# Patient Record
Sex: FEMALE | Race: WHITE
[De-identification: names, ages, dates, MRNs, and addresses within clinical notes are randomized per-mention and may not be internally consistent; named-entity substitution may affect disease eponyms.]

---

## 2023-06-08 ENCOUNTER — APPOINTMENT (OUTPATIENT)
Dept: FAMILY MEDICINE | Facility: CLINIC | Age: 33
End: 2023-06-08
Payer: COMMERCIAL

## 2023-06-08 ENCOUNTER — NON-APPOINTMENT (OUTPATIENT)
Age: 33
End: 2023-06-08

## 2023-06-08 VITALS
HEART RATE: 88 BPM | HEIGHT: 64 IN | SYSTOLIC BLOOD PRESSURE: 100 MMHG | DIASTOLIC BLOOD PRESSURE: 68 MMHG | TEMPERATURE: 98 F | OXYGEN SATURATION: 98 % | BODY MASS INDEX: 23.9 KG/M2 | WEIGHT: 140 LBS

## 2023-06-08 DIAGNOSIS — Z00.00 ENCOUNTER FOR GENERAL ADULT MEDICAL EXAMINATION W/OUT ABNORMAL FINDINGS: ICD-10-CM

## 2023-06-08 PROCEDURE — 99385 PREV VISIT NEW AGE 18-39: CPT | Mod: 25

## 2023-06-08 PROCEDURE — 36415 COLL VENOUS BLD VENIPUNCTURE: CPT

## 2023-06-08 NOTE — PHYSICAL EXAM
[No Acute Distress] : no acute distress [Well Nourished] : well nourished [Well Developed] : well developed [Well-Appearing] : well-appearing [Normal Voice/Communication] : normal voice/communication [Normal Sclera/Conjunctiva] : normal sclera/conjunctiva [Supple] : supple [No Respiratory Distress] : no respiratory distress  [Clear to Auscultation] : lungs were clear to auscultation bilaterally [Normal Rate] : normal rate  [Normal S1, S2] : normal S1 and S2 [Soft] : abdomen soft [Non Tender] : non-tender [Normal Bowel Sounds] : normal bowel sounds obese, rrr [Speech Grossly Normal] : speech grossly normal [Normal Affect] : the affect was normal [Normal Mood] : the mood was normal

## 2023-06-08 NOTE — PLAN
[FreeTextEntry1] : 32-year-old female for new patient physical exam.  Labs as ordered.  Patient reports being up-to-date with Pap.\par \par Pregnancy.  Keep follow-up with OB.\par \par All questions answered.  Patient voiced understanding and agreement to plan.  Return to clinic as recommended.

## 2023-06-08 NOTE — HEALTH RISK ASSESSMENT
[No] : In the past 12 months have you used drugs other than those required for medical reasons? No [Patient reported PAP Smear was abnormal] : Patient reported PAP Smear was abnormal [Never] : Never [EyeExamDate] : 00/2020 [PapSmearDate] : 03/2022

## 2023-06-08 NOTE — HISTORY OF PRESENT ILLNESS
[FreeTextEntry1] : NPPE [de-identified] : 32-year-old female for new patient physical exam.  Patient reports being pregnant currently.  Went through IVF.  Doing well.  Patient works as a speech pathologist in the city.

## 2023-06-09 LAB
ALBUMIN SERPL ELPH-MCNC: 4.1 G/DL
ALP BLD-CCNC: 57 U/L
ALT SERPL-CCNC: 30 U/L
ANION GAP SERPL CALC-SCNC: 13 MMOL/L
APTT BLD: 29.6 SEC
AST SERPL-CCNC: 24 U/L
BILIRUB SERPL-MCNC: 0.2 MG/DL
BUN SERPL-MCNC: 11 MG/DL
CALCIUM SERPL-MCNC: 9.1 MG/DL
CHLORIDE SERPL-SCNC: 104 MMOL/L
CHOLEST SERPL-MCNC: 135 MG/DL
CO2 SERPL-SCNC: 23 MMOL/L
CREAT SERPL-MCNC: 0.77 MG/DL
EGFR: 106 ML/MIN/1.73M2
ESTIMATED AVERAGE GLUCOSE: 100 MG/DL
GLUCOSE SERPL-MCNC: 84 MG/DL
HBA1C MFR BLD HPLC: 5.1 %
HDLC SERPL-MCNC: 55 MG/DL
INR PPP: 0.99 RATIO
LDLC SERPL CALC-MCNC: 61 MG/DL
NONHDLC SERPL-MCNC: 80 MG/DL
POTASSIUM SERPL-SCNC: 4.6 MMOL/L
PROT SERPL-MCNC: 6.5 G/DL
PT BLD: 11.6 SEC
SODIUM SERPL-SCNC: 140 MMOL/L
TRIGL SERPL-MCNC: 96 MG/DL
TSH SERPL-ACNC: 1.85 UIU/ML

## 2023-06-13 ENCOUNTER — TRANSCRIPTION ENCOUNTER (OUTPATIENT)
Age: 33
End: 2023-06-13

## 2024-02-01 ENCOUNTER — INPATIENT (INPATIENT)
Facility: HOSPITAL | Age: 34
LOS: 2 days | Discharge: HOME | End: 2024-02-04
Attending: OBSTETRICS & GYNECOLOGY | Admitting: OBSTETRICS & GYNECOLOGY
Payer: COMMERCIAL

## 2024-02-01 VITALS
SYSTOLIC BLOOD PRESSURE: 123 MMHG | TEMPERATURE: 99 F | RESPIRATION RATE: 18 BRPM | HEART RATE: 69 BPM | DIASTOLIC BLOOD PRESSURE: 78 MMHG

## 2024-02-01 DIAGNOSIS — A63.0 ANOGENITAL (VENEREAL) WARTS: ICD-10-CM

## 2024-02-01 DIAGNOSIS — Z3A.39 39 WEEKS GESTATION OF PREGNANCY: ICD-10-CM

## 2024-02-01 DIAGNOSIS — O41.1230 CHORIOAMNIONITIS, THIRD TRIMESTER, NOT APPLICABLE OR UNSPECIFIED: ICD-10-CM

## 2024-02-01 DIAGNOSIS — Z88.0 ALLERGY STATUS TO PENICILLIN: ICD-10-CM

## 2024-02-01 DIAGNOSIS — Z3A.00 WEEKS OF GESTATION OF PREGNANCY NOT SPECIFIED: ICD-10-CM

## 2024-02-01 DIAGNOSIS — J45.909 UNSPECIFIED ASTHMA, UNCOMPLICATED: ICD-10-CM

## 2024-02-01 DIAGNOSIS — D62 ACUTE POSTHEMORRHAGIC ANEMIA: ICD-10-CM

## 2024-02-01 DIAGNOSIS — O26.899 OTHER SPECIFIED PREGNANCY RELATED CONDITIONS, UNSPECIFIED TRIMESTER: ICD-10-CM

## 2024-02-01 LAB
ABO RH CONFIRMATION: SIGNIFICANT CHANGE UP
BASOPHILS # BLD AUTO: 0.05 K/UL — SIGNIFICANT CHANGE UP (ref 0–0.2)
BASOPHILS NFR BLD AUTO: 0.4 % — SIGNIFICANT CHANGE UP (ref 0–2)
EOSINOPHIL # BLD AUTO: 0.16 K/UL — SIGNIFICANT CHANGE UP (ref 0–0.5)
EOSINOPHIL NFR BLD AUTO: 1.1 % — SIGNIFICANT CHANGE UP (ref 0–6)
HCT VFR BLD CALC: 34.4 % — LOW (ref 34.5–45)
HGB BLD-MCNC: 12.2 G/DL — SIGNIFICANT CHANGE UP (ref 11.5–15.5)
IMM GRANULOCYTES NFR BLD AUTO: 0.5 % — SIGNIFICANT CHANGE UP (ref 0–0.9)
LYMPHOCYTES # BLD AUTO: 14.2 % — SIGNIFICANT CHANGE UP (ref 13–44)
LYMPHOCYTES # BLD AUTO: 2 K/UL — SIGNIFICANT CHANGE UP (ref 1–3.3)
MCHC RBC-ENTMCNC: 31.2 PG — SIGNIFICANT CHANGE UP (ref 27–34)
MCHC RBC-ENTMCNC: 35.5 GM/DL — SIGNIFICANT CHANGE UP (ref 32–36)
MCV RBC AUTO: 88 FL — SIGNIFICANT CHANGE UP (ref 80–100)
MONOCYTES # BLD AUTO: 0.91 K/UL — HIGH (ref 0–0.9)
MONOCYTES NFR BLD AUTO: 6.5 % — SIGNIFICANT CHANGE UP (ref 2–14)
NEUTROPHILS # BLD AUTO: 10.87 K/UL — HIGH (ref 1.8–7.4)
NEUTROPHILS NFR BLD AUTO: 77.3 % — HIGH (ref 43–77)
PLATELET # BLD AUTO: 215 K/UL — SIGNIFICANT CHANGE UP (ref 150–400)
RBC # BLD: 3.91 M/UL — SIGNIFICANT CHANGE UP (ref 3.8–5.2)
RBC # FLD: 13 % — SIGNIFICANT CHANGE UP (ref 10.3–14.5)
WBC # BLD: 14.06 K/UL — HIGH (ref 3.8–10.5)
WBC # FLD AUTO: 14.06 K/UL — HIGH (ref 3.8–10.5)

## 2024-02-01 PROCEDURE — C1889: CPT

## 2024-02-01 PROCEDURE — 99214 OFFICE O/P EST MOD 30 MIN: CPT

## 2024-02-01 PROCEDURE — 86900 BLOOD TYPING SEROLOGIC ABO: CPT

## 2024-02-01 PROCEDURE — 86780 TREPONEMA PALLIDUM: CPT

## 2024-02-01 PROCEDURE — 94760 N-INVAS EAR/PLS OXIMETRY 1: CPT

## 2024-02-01 PROCEDURE — 36415 COLL VENOUS BLD VENIPUNCTURE: CPT

## 2024-02-01 PROCEDURE — 94640 AIRWAY INHALATION TREATMENT: CPT

## 2024-02-01 PROCEDURE — 59050 FETAL MONITOR W/REPORT: CPT

## 2024-02-01 PROCEDURE — 86850 RBC ANTIBODY SCREEN: CPT

## 2024-02-01 PROCEDURE — 86901 BLOOD TYPING SEROLOGIC RH(D): CPT

## 2024-02-01 PROCEDURE — 85025 COMPLETE CBC W/AUTO DIFF WBC: CPT

## 2024-02-01 RX ORDER — CITRIC ACID/SODIUM CITRATE 300-500 MG
30 SOLUTION, ORAL ORAL ONCE
Refills: 0 | Status: DISCONTINUED | OUTPATIENT
Start: 2024-02-01 | End: 2024-02-02

## 2024-02-01 RX ORDER — OXYTOCIN 10 UNIT/ML
333.33 VIAL (ML) INJECTION
Qty: 20 | Refills: 0 | Status: COMPLETED | OUTPATIENT
Start: 2024-02-01 | End: 2024-02-02

## 2024-02-01 RX ORDER — CHLORHEXIDINE GLUCONATE 213 G/1000ML
1 SOLUTION TOPICAL DAILY
Refills: 0 | Status: DISCONTINUED | OUTPATIENT
Start: 2024-02-01 | End: 2024-02-02

## 2024-02-01 RX ORDER — OXYTOCIN 10 UNIT/ML
VIAL (ML) INJECTION
Qty: 30 | Refills: 0 | Status: DISCONTINUED | OUTPATIENT
Start: 2024-02-01 | End: 2024-02-02

## 2024-02-01 RX ORDER — SODIUM CHLORIDE 9 MG/ML
1000 INJECTION, SOLUTION INTRAVENOUS
Refills: 0 | Status: DISCONTINUED | OUTPATIENT
Start: 2024-02-01 | End: 2024-02-02

## 2024-02-01 RX ADMIN — SODIUM CHLORIDE 125 MILLILITER(S): 9 INJECTION, SOLUTION INTRAVENOUS at 21:04

## 2024-02-01 RX ADMIN — Medication 2 MILLIUNIT(S)/MIN: at 21:04

## 2024-02-01 NOTE — OB PROVIDER H&P - NSLOWPPHRISK_OBGYN_A_OB
No previous uterine incision/[place] Pregnancy/Less than or equal to 4 previous vaginal births/No known bleeding disorder/No history of postpartum hemorrhage/No other PPH risks indicated

## 2024-02-01 NOTE — OB PROVIDER H&P - HISTORY OF PRESENT ILLNESS
GHISLAINE ARMSTRONG is a 32yo  at 39w5d by LMP 23 MALISSA 2/3/24 presenting for painful contractions over the past day, currently every 5min.    PNC w Dr. Ray:  1. IVF pregnancy    OBhx: denies  Gyn: denies  PMH: denies  PSH: denies  Med: PNV  Allergies: PCN (hives)       GHISLAINE ARMSTRONG is a 32yo  at 39w5d by LMP 23 MALISSA 2/3/24 presenting for painful contractions over the past day, currently every 5min.    PNC w Dr. Ray:  1. IVF pregnancy    OBhx: denies  Gyn: denies fibroids, +LSIL, - STDs  PMH: asthma (childhood)  PSH: denies  Med: PNV, ASA  Allergies: PCN (hives)

## 2024-02-01 NOTE — OB PROVIDER H&P - ASSESSMENT
A/P: GHISLAINE ARMSTRONG is a 34yo  at 39w5d by LMP 23 MALISSA 2/3/24 presenting for painful contractions over the past day, currently every 5min.    Admission labs  Consent  GBS neg, no ppx  Cephalic, intact, 3/90/1  Bianca irregularly  FHT category I  Plan for pitocin induction  Analgesia PRN    D/w Dr. Spann   A/P: GHISLAINE ARMSTRONG is a 34yo  at 39w5d by LMP 23 MALISSA 2/3/24 presenting for painful contractions over the past day, currently every 5min.    Admission labs  Consent  GBS neg, no ppx  Cephalic, intact, 3/90/1  Bianca irregularly  FHT category I  Plan for pitocin  Analgesia PRN    D/w Dr. Spann

## 2024-02-01 NOTE — OB PROVIDER H&P - ATTENDING COMMENTS
Agree with above assessment and plan. Patient admitted for labor.     Patient seen at bedside.  Consented for vaginal delivery, possible operative vaginal delivery, possible  section, and any other indicated procedures.  The risks, benefits and alternatives of all above procedures were discussed. Questions were encouraged and answered. Written consent was signed.

## 2024-02-01 NOTE — OB PROVIDER H&P - NSHPPHYSICALEXAM_GEN_ALL_CORE
Vitals:   T(C): 37.2 (02-01-24 @ 19:44), Max: 37.2 (02-01-24 @ 19:44)  T(F): 99 (02-01-24 @ 19:44), Max: 99 (02-01-24 @ 19:44)  HR: 69 (02-01-24 @ 19:44) (69 - 69)  BP: 123/78 (02-01-24 @ 19:44) (123/78 - 123/78)  RR: 18 (02-01-24 @ 19:44) (18 - 18)    General: AAOx3, NAD  Abd: Soft, nontender, gravid  SVE: 3/90/-1, intact    FHT: 130bpm mod variability +accels -decels  [place]: irregular q5-7min    Bedside sono: cephalic Vitals:   T(C): 37.2 (02-01-24 @ 19:44), Max: 37.2 (02-01-24 @ 19:44)  T(F): 99 (02-01-24 @ 19:44), Max: 99 (02-01-24 @ 19:44)  HR: 69 (02-01-24 @ 19:44) (69 - 69)  BP: 123/78 (02-01-24 @ 19:44) (123/78 - 123/78)  RR: 18 (02-01-24 @ 19:44) (18 - 18)    General: AAOx3, NAD  Abd: Soft, nontender, gravid  SVE: 3/90/-1, intact    FHT: 130bpm mod variability +accels -decels  [place]: irregular q5-7min    Bedside sono: cephalic  EFW 12/21 2093g (27%ile)

## 2024-02-01 NOTE — OB RN TRIAGE NOTE - FALL HARM RISK - UNIVERSAL INTERVENTIONS
Bed in lowest position, wheels locked, appropriate side rails in place/Call bell, personal items and telephone in reach/Instruct patient to call for assistance before getting out of bed or chair/Non-slip footwear when patient is out of bed/[place] to call system/Physically safe environment - no spills, clutter or unnecessary equipment/Purposeful Proactive Rounding/Room/bathroom lighting operational, light cord in reach

## 2024-02-02 LAB — T PALLIDUM AB TITR SER: NEGATIVE — SIGNIFICANT CHANGE UP

## 2024-02-02 RX ORDER — AER TRAVELER 0.5 G/1
1 SOLUTION RECTAL; TOPICAL EVERY 4 HOURS
Refills: 0 | Status: DISCONTINUED | OUTPATIENT
Start: 2024-02-02 | End: 2024-02-04

## 2024-02-02 RX ORDER — DIBUCAINE 1 %
1 OINTMENT (GRAM) RECTAL EVERY 6 HOURS
Refills: 0 | Status: DISCONTINUED | OUTPATIENT
Start: 2024-02-02 | End: 2024-02-04

## 2024-02-02 RX ORDER — ACETAMINOPHEN 500 MG
975 TABLET ORAL
Refills: 0 | Status: DISCONTINUED | OUTPATIENT
Start: 2024-02-02 | End: 2024-02-04

## 2024-02-02 RX ORDER — LANOLIN
1 OINTMENT (GRAM) TOPICAL EVERY 6 HOURS
Refills: 0 | Status: DISCONTINUED | OUTPATIENT
Start: 2024-02-02 | End: 2024-02-04

## 2024-02-02 RX ORDER — ACETAMINOPHEN 500 MG
1000 TABLET ORAL ONCE
Refills: 0 | Status: COMPLETED | OUTPATIENT
Start: 2024-02-02 | End: 2024-02-02

## 2024-02-02 RX ORDER — IBUPROFEN 200 MG
600 TABLET ORAL EVERY 6 HOURS
Refills: 0 | Status: DISCONTINUED | OUTPATIENT
Start: 2024-02-02 | End: 2024-02-04

## 2024-02-02 RX ORDER — IBUPROFEN 200 MG
600 TABLET ORAL EVERY 6 HOURS
Refills: 0 | Status: COMPLETED | OUTPATIENT
Start: 2024-02-02 | End: 2024-12-31

## 2024-02-02 RX ORDER — TETANUS TOXOID, REDUCED DIPHTHERIA TOXOID AND ACELLULAR PERTUSSIS VACCINE, ADSORBED 5; 2.5; 8; 8; 2.5 [IU]/.5ML; [IU]/.5ML; UG/.5ML; UG/.5ML; UG/.5ML
0.5 SUSPENSION INTRAMUSCULAR ONCE
Refills: 0 | Status: DISCONTINUED | OUTPATIENT
Start: 2024-02-02 | End: 2024-02-04

## 2024-02-02 RX ORDER — GENTAMICIN SULFATE 40 MG/ML
400 VIAL (ML) INJECTION ONCE
Refills: 0 | Status: DISCONTINUED | OUTPATIENT
Start: 2024-02-02 | End: 2024-02-02

## 2024-02-02 RX ORDER — SODIUM CHLORIDE 9 MG/ML
3 INJECTION INTRAMUSCULAR; INTRAVENOUS; SUBCUTANEOUS EVERY 8 HOURS
Refills: 0 | Status: DISCONTINUED | OUTPATIENT
Start: 2024-02-02 | End: 2024-02-02

## 2024-02-02 RX ORDER — SACCHAROMYCES BOULARDII 250 MG
250 POWDER IN PACKET (EA) ORAL
Refills: 0 | Status: DISCONTINUED | OUTPATIENT
Start: 2024-02-02 | End: 2024-02-04

## 2024-02-02 RX ORDER — HYDROCORTISONE 1 %
1 OINTMENT (GRAM) TOPICAL EVERY 6 HOURS
Refills: 0 | Status: DISCONTINUED | OUTPATIENT
Start: 2024-02-02 | End: 2024-02-04

## 2024-02-02 RX ORDER — DIPHENOXYLATE HCL/ATROPINE 2.5-.025MG
1 TABLET ORAL ONCE
Refills: 0 | Status: DISCONTINUED | OUTPATIENT
Start: 2024-02-02 | End: 2024-02-02

## 2024-02-02 RX ORDER — OXYCODONE HYDROCHLORIDE 5 MG/1
5 TABLET ORAL ONCE
Refills: 0 | Status: DISCONTINUED | OUTPATIENT
Start: 2024-02-02 | End: 2024-02-04

## 2024-02-02 RX ORDER — SIMETHICONE 80 MG/1
80 TABLET, CHEWABLE ORAL EVERY 4 HOURS
Refills: 0 | Status: DISCONTINUED | OUTPATIENT
Start: 2024-02-02 | End: 2024-02-04

## 2024-02-02 RX ORDER — PRAMOXINE HYDROCHLORIDE 150 MG/15G
1 AEROSOL, FOAM RECTAL EVERY 4 HOURS
Refills: 0 | Status: DISCONTINUED | OUTPATIENT
Start: 2024-02-02 | End: 2024-02-04

## 2024-02-02 RX ORDER — GENTAMICIN SULFATE 40 MG/ML
385 VIAL (ML) INJECTION ONCE
Refills: 0 | Status: COMPLETED | OUTPATIENT
Start: 2024-02-02 | End: 2024-02-02

## 2024-02-02 RX ORDER — OXYTOCIN 10 UNIT/ML
41.67 VIAL (ML) INJECTION
Qty: 20 | Refills: 0 | Status: DISCONTINUED | OUTPATIENT
Start: 2024-02-02 | End: 2024-02-04

## 2024-02-02 RX ORDER — DIPHENHYDRAMINE HCL 50 MG
25 CAPSULE ORAL EVERY 6 HOURS
Refills: 0 | Status: DISCONTINUED | OUTPATIENT
Start: 2024-02-02 | End: 2024-02-04

## 2024-02-02 RX ORDER — OXYCODONE HYDROCHLORIDE 5 MG/1
5 TABLET ORAL
Refills: 0 | Status: DISCONTINUED | OUTPATIENT
Start: 2024-02-02 | End: 2024-02-04

## 2024-02-02 RX ORDER — KETOROLAC TROMETHAMINE 30 MG/ML
30 SYRINGE (ML) INJECTION ONCE
Refills: 0 | Status: DISCONTINUED | OUTPATIENT
Start: 2024-02-02 | End: 2024-02-02

## 2024-02-02 RX ORDER — MAGNESIUM HYDROXIDE 400 MG/1
30 TABLET, CHEWABLE ORAL
Refills: 0 | Status: DISCONTINUED | OUTPATIENT
Start: 2024-02-02 | End: 2024-02-04

## 2024-02-02 RX ORDER — BENZOCAINE 10 %
1 GEL (GRAM) MUCOUS MEMBRANE EVERY 6 HOURS
Refills: 0 | Status: DISCONTINUED | OUTPATIENT
Start: 2024-02-02 | End: 2024-02-04

## 2024-02-02 RX ADMIN — Medication 975 MILLIGRAM(S): at 12:00

## 2024-02-02 RX ADMIN — Medication 250 MILLIGRAM(S): at 21:13

## 2024-02-02 RX ADMIN — Medication 509.62 MILLIGRAM(S): at 15:43

## 2024-02-02 RX ADMIN — Medication 0.2 MILLIGRAM(S): at 15:17

## 2024-02-02 RX ADMIN — Medication 0.2 MILLIGRAM(S): at 11:16

## 2024-02-02 RX ADMIN — Medication 1 TABLET(S): at 11:15

## 2024-02-02 RX ADMIN — Medication 0.2 MILLIGRAM(S): at 18:43

## 2024-02-02 RX ADMIN — Medication 0.2 MILLIGRAM(S): at 07:35

## 2024-02-02 RX ADMIN — Medication 600 MILLIGRAM(S): at 21:13

## 2024-02-02 RX ADMIN — Medication 600 MILLIGRAM(S): at 22:13

## 2024-02-02 RX ADMIN — Medication 1000 MILLIUNIT(S)/MIN: at 07:30

## 2024-02-02 RX ADMIN — Medication 100 MILLIGRAM(S): at 14:07

## 2024-02-02 RX ADMIN — Medication 600 MILLIGRAM(S): at 15:49

## 2024-02-02 RX ADMIN — Medication 0.2 MILLIGRAM(S): at 22:47

## 2024-02-02 RX ADMIN — Medication 400 MILLIGRAM(S): at 05:51

## 2024-02-02 RX ADMIN — Medication 100 MILLIGRAM(S): at 06:08

## 2024-02-02 RX ADMIN — Medication 975 MILLIGRAM(S): at 11:15

## 2024-02-02 RX ADMIN — Medication 1 TABLET(S): at 21:13

## 2024-02-02 RX ADMIN — Medication 30 MILLIGRAM(S): at 08:53

## 2024-02-02 RX ADMIN — Medication 975 MILLIGRAM(S): at 17:53

## 2024-02-02 RX ADMIN — Medication 600 MILLIGRAM(S): at 15:16

## 2024-02-02 RX ADMIN — Medication 975 MILLIGRAM(S): at 18:34

## 2024-02-02 NOTE — OB RN DELIVERY SUMMARY - NSSELHIDDEN_OBGYN_ALL_OB_FT
[NS_DeliveryAttending1_OBGYN_ALL_OB_FT:[id number]==] [NS_DeliveryAttending1_OBGYN_ALL_OB_FT:[id number]==],[NS_DeliveryRN_OBGYN_ALL_OB_FT:[id number]=]

## 2024-02-02 NOTE — OB RN DELIVERY SUMMARY - NS_SEPSISRSKCALC_OBGYN_ALL_OB_FT
EOS calculated successfully. EOS Risk Factor: 0.5/1000 live births ([facility] national incidence); GA=39w6d; Temp=99; ROM=6.917; GBS='Negative'; Antibiotics='Broad spectrum antibiotics 2-3.9 hrs prior to birth'

## 2024-02-02 NOTE — OB PROVIDER DELIVERY SUMMARY - NSPROVIDERDELIVERYNOTE_OBGYN_ALL_OB_FT
Vaginal delivery viable male , to mother's chest for skin to skin.   Placenta spontaneous and intact.   Brisk bleeding noted after placenta delivered, lower uterine segment noted to be atonic  Methergine 0.2 mg IM and uterine massage with good effect.   2nd degree perineal laceration repaired in standard fashion  Excellent hemostasis and cosmesis  Mother and infant stable

## 2024-02-02 NOTE — OB PROVIDER LABOR PROGRESS NOTE - ASSESSMENT
Post-Op Assessment Note      CV Status:  Stable    Mental Status:  Alert, awake and somnolent    Hydration Status:  Euvolemic    PONV Controlled:  Controlled    Airway Patency:  Patent  Airway: intubated    Post Op Vitals Reviewed: Yes          Staff: CRNA           BP      Temp      Pulse     Resp      SpO2
FHT category I  Will being pushing shortly
FHT category I  S/p SROM, clear  C/w pitocin  SVE when constant rectal pressure

## 2024-02-02 NOTE — OB PROVIDER LABOR PROGRESS NOTE - NS_DILATION_OBGYN_ALL_OB_NU
6
Dictated  Neto Gallagher MD  7:19 AM  2/28/2020
Rectal exam done by Francesco Bravo RN. Anal manometry catheter inserted into rectum. Manometry procedure complete. Catheter inserted into rectum. Balloon filled with 40cc of luke warm H2O, and pt escorted to bathroom for 3 min expulsion test.  Pt was able to expel balloon. Balloon deflated and catheter removed. Pt tolerated procedures well.
10

## 2024-02-03 ENCOUNTER — TRANSCRIPTION ENCOUNTER (OUTPATIENT)
Age: 34
End: 2024-02-03

## 2024-02-03 LAB
BASOPHILS # BLD AUTO: 0.05 K/UL — SIGNIFICANT CHANGE UP (ref 0–0.2)
BASOPHILS NFR BLD AUTO: 0.4 % — SIGNIFICANT CHANGE UP (ref 0–2)
EOSINOPHIL # BLD AUTO: 0.2 K/UL — SIGNIFICANT CHANGE UP (ref 0–0.5)
EOSINOPHIL NFR BLD AUTO: 1.5 % — SIGNIFICANT CHANGE UP (ref 0–6)
HCT VFR BLD CALC: 27 % — LOW (ref 34.5–45)
HGB BLD-MCNC: 9.2 G/DL — LOW (ref 11.5–15.5)
IMM GRANULOCYTES NFR BLD AUTO: 0.4 % — SIGNIFICANT CHANGE UP (ref 0–0.9)
LYMPHOCYTES # BLD AUTO: 17.9 % — SIGNIFICANT CHANGE UP (ref 13–44)
LYMPHOCYTES # BLD AUTO: 2.41 K/UL — SIGNIFICANT CHANGE UP (ref 1–3.3)
MCHC RBC-ENTMCNC: 31 PG — SIGNIFICANT CHANGE UP (ref 27–34)
MCHC RBC-ENTMCNC: 34.1 GM/DL — SIGNIFICANT CHANGE UP (ref 32–36)
MCV RBC AUTO: 90.9 FL — SIGNIFICANT CHANGE UP (ref 80–100)
MONOCYTES # BLD AUTO: 0.78 K/UL — SIGNIFICANT CHANGE UP (ref 0–0.9)
MONOCYTES NFR BLD AUTO: 5.8 % — SIGNIFICANT CHANGE UP (ref 2–14)
NEUTROPHILS # BLD AUTO: 9.95 K/UL — HIGH (ref 1.8–7.4)
NEUTROPHILS NFR BLD AUTO: 74 % — SIGNIFICANT CHANGE UP (ref 43–77)
PLATELET # BLD AUTO: 168 K/UL — SIGNIFICANT CHANGE UP (ref 150–400)
RBC # BLD: 2.97 M/UL — LOW (ref 3.8–5.2)
RBC # FLD: 13.5 % — SIGNIFICANT CHANGE UP (ref 10.3–14.5)
WBC # BLD: 13.44 K/UL — HIGH (ref 3.8–10.5)
WBC # FLD AUTO: 13.44 K/UL — HIGH (ref 3.8–10.5)

## 2024-02-03 RX ORDER — ACETAMINOPHEN 500 MG
3 TABLET ORAL
Qty: 0 | Refills: 0 | DISCHARGE
Start: 2024-02-03

## 2024-02-03 RX ORDER — IBUPROFEN 200 MG
1 TABLET ORAL
Qty: 0 | Refills: 0 | DISCHARGE
Start: 2024-02-03

## 2024-02-03 RX ORDER — FLUTICASONE PROPIONATE 50 MCG
2 SPRAY, SUSPENSION NASAL
Refills: 0 | Status: DISCONTINUED | OUTPATIENT
Start: 2024-02-03 | End: 2024-02-04

## 2024-02-03 RX ADMIN — Medication 975 MILLIGRAM(S): at 23:51

## 2024-02-03 RX ADMIN — Medication 600 MILLIGRAM(S): at 10:00

## 2024-02-03 RX ADMIN — Medication 600 MILLIGRAM(S): at 20:33

## 2024-02-03 RX ADMIN — Medication 600 MILLIGRAM(S): at 02:49

## 2024-02-03 RX ADMIN — Medication 975 MILLIGRAM(S): at 23:21

## 2024-02-03 RX ADMIN — Medication 975 MILLIGRAM(S): at 02:01

## 2024-02-03 RX ADMIN — Medication 975 MILLIGRAM(S): at 17:53

## 2024-02-03 RX ADMIN — Medication 600 MILLIGRAM(S): at 20:03

## 2024-02-03 RX ADMIN — Medication 2 SPRAY(S): at 12:01

## 2024-02-03 RX ADMIN — Medication 0.2 MILLIGRAM(S): at 03:03

## 2024-02-03 RX ADMIN — Medication 250 MILLIGRAM(S): at 09:56

## 2024-02-03 RX ADMIN — Medication 975 MILLIGRAM(S): at 03:13

## 2024-02-03 RX ADMIN — Medication 600 MILLIGRAM(S): at 09:34

## 2024-02-03 RX ADMIN — Medication 250 MILLIGRAM(S): at 20:03

## 2024-02-03 RX ADMIN — Medication 600 MILLIGRAM(S): at 03:14

## 2024-02-03 RX ADMIN — Medication 975 MILLIGRAM(S): at 12:01

## 2024-02-03 RX ADMIN — Medication 1 TABLET(S): at 09:33

## 2024-02-03 NOTE — DISCHARGE NOTE OB - SWOLLEN AREA ON THE LEG THAT IS PAINFUL, RED OR HOT
Jorge Luis Land Patient Age: 9 year old  MESSAGE:   Patients mother calling, states feels patient may have asthma and would like patient to have a spirometry test to verify. Patient is schedule for spirometry on 12/16 as well as the covid test on 12/14 however covid test needs to be ordered. Please advise. No call back needed. Message confirmed with caller.       WEIGHT AND HEIGHT:   Wt Readings from Last 1 Encounters:   10/22/21 28.5 kg (62 lb 12.8 oz) (72 %, Z= 0.58)*     * Growth percentiles are based on CDC (Boys, 2-20 Years) data.     Ht Readings from Last 1 Encounters:   08/03/21 4' 2\" (1.27 m) (50 %, Z= -0.01)*     * Growth percentiles are based on CDC (Boys, 2-20 Years) data.     BMI Readings from Last 1 Encounters:   08/03/21 16.93 kg/m² (74 %, Z= 0.66)*     * Growth percentiles are based on CDC (Boys, 2-20 Years) data.       ALLERGIES:  Cat hair extract, Dog dander, and Dog epithelium  Current Outpatient Medications   Medication   • amoxicillin (AMOXIL) 400 MG/5ML suspension   • cetirizine (ZyrTEC) 5 MG/5ML solution   • MELATONIN PO   • Pediatric Multiple Vitamins (MULTIVITAMIN INFANT & TODDLER) Solution     No current facility-administered medications for this visit.     PHARMACY to use: N/a          Pharmacy preference(s) on file:   [id number] [place], [place] - [address]  [address] [address] [facility] [address]  Phone: [phone] Fax: [phone]    [id number] [place], [place] - [address] AT [facility]  [address]  [facility] [address]  Phone: [phone] Fax: [phone]      CALL BACK INFO: Ok to leave response (including medical information) on answering machine  ROUTING: Patient's physician/staff        PCP: Reyna William MD         INS: Payor: SCOTT/ROBERT / Plan: [id number] / Product Type: PPO MISC   PATIENT ADDRESS:  [address] [place] [id number]   Statement Selected

## 2024-02-03 NOTE — DISCHARGE NOTE OB - HOSPITAL COURSE
Patient s/p  at 39w5d complicated by acute chorioamnionitis (s/p IV antibiotics) and RITA atony s/p uterotonics. Labs and vitals stable on discharge.

## 2024-02-03 NOTE — DISCHARGE NOTE OB - CARE PLAN
Principal Discharge DX:	Normal spontaneous vaginal delivery  Assessment and plan of treatment:	Patient post-partum had an uncomplicated hospital course. Her pain was well controlled. She is tolerating a regular diet. She is ambulating independently. Labs and Vitals WNL upon discharge.  1) Please take ibuprofen and/or Tylenol as needed for pain   2) Nothing in the vagina for 6 weeks (including no sex, no tampons, and no douching).  3) Please call your doctor for a follow up your postpartum appointment in 4-6 weeks.  4) Please continue taking vitamins postpartum.   5) Please call the office sooner if you have heavy vaginal bleeding, severe abdominal pain, or fever > 100.4F.  6) You may resume regular daily activity as tolerated  Secondary Diagnosis:	Anemia due to acute blood loss  Assessment and plan of treatment:	Patient's delivery complicated by RITA atony s/p uterotonics. Appropriate drop in hb. Patient asymptomatic.  Secondary Diagnosis:	Chorioamnionitis  Assessment and plan of treatment:	Treated with IV antibiotics. Vitals and labs stable on discharge to home.   1

## 2024-02-03 NOTE — DISCHARGE NOTE OB - CARE PROVIDER_API CALL
Yessica Ray  Obstetrics and Gynecology  [address]  [place] [address]  Phone: [phone]  Fax: [phone]  Follow Up Time: 1 month

## 2024-02-03 NOTE — DISCHARGE NOTE OB - PLAN OF CARE
Patient post-partum had an uncomplicated hospital course. Her pain was well controlled. She is tolerating a regular diet. She is ambulating independently. Labs and Vitals WNL upon discharge.  1) Please take ibuprofen and/or Tylenol as needed for pain   2) Nothing in the vagina for 6 weeks (including no sex, no tampons, and no douching).  3) Please call your doctor for a follow up your postpartum appointment in 4-6 weeks.  4) Please continue taking vitamins postpartum.   5) Please call the office sooner if you have heavy vaginal bleeding, severe abdominal pain, or fever > 100.4F.  6) You may resume regular daily activity as tolerated Treated with IV antibiotics. Vitals and labs stable on discharge to home. Patient's delivery complicated by RITA atony s/p uterotonics. Appropriate drop in hb. Patient asymptomatic.

## 2024-02-03 NOTE — DISCHARGE NOTE OB - PATIENT PORTAL LINK FT
You can access the FollowMyHealth Patient Portal offered by [facility] by registering at the following website: [email or web address]. By joining [employer]’s FollowMyHealth portal, you will also be able to view your health information using other applications (apps) compatible with our system.

## 2024-02-04 VITALS
DIASTOLIC BLOOD PRESSURE: 63 MMHG | SYSTOLIC BLOOD PRESSURE: 111 MMHG | OXYGEN SATURATION: 100 % | RESPIRATION RATE: 17 BRPM | TEMPERATURE: 99 F | HEART RATE: 75 BPM

## 2024-02-04 RX ADMIN — Medication 600 MILLIGRAM(S): at 09:00

## 2024-02-04 RX ADMIN — Medication 1 TABLET(S): at 08:39

## 2024-02-04 RX ADMIN — Medication 975 MILLIGRAM(S): at 06:36

## 2024-02-04 RX ADMIN — Medication 975 MILLIGRAM(S): at 06:06

## 2024-02-04 RX ADMIN — Medication 600 MILLIGRAM(S): at 08:38

## 2024-02-04 RX ADMIN — Medication 600 MILLIGRAM(S): at 03:47

## 2024-02-04 RX ADMIN — Medication 600 MILLIGRAM(S): at 03:17

## 2024-02-04 NOTE — PROGRESS NOTE ADULT - ASSESSMENT
stable
GHISLAINE ARMSTRONG is a 33y  now PPD#2 s/p spontaneous vaginal delivery at 39w5d gestation, complicated by chorioamnionitis s/p gentamicin/clindamycin and lower uterine segment atony s/p methergine.      A/P:    -Vital signs stable  -Hgb: 12.2 -> 9.2  -Voiding, tolerating PO  -Advance care as tolerated   -Continue routine postpartum care and education  -Dispo: Anticipate discharge to home pending attending approval.

## 2024-02-04 NOTE — PROGRESS NOTE ADULT - SUBJECTIVE AND OBJECTIVE BOX
GHISLAINE ARMSTRONG is a 33y  now PPD#2 s/p spontaneous vaginal delivery at 39w5d gestation, complicated by chorioamnionitis s/p gentamicin/clindamycin and lower uterine segment atony s/p methergine.    S:    No acute events overnight.   The patient has no complaints.  Pain controlled with current treatment regimen.   She is ambulating without difficulty and tolerating PO.   + flatus/-BM/+ voiding   She endorses appropriate lochia, which is decreasing.   She denies fevers, chills, nausea and vomiting.   She denies lightheadedness, dizziness, palpitations, chest pain and SOB.   She denies headaches, vision changes, and RUQ pain.    O:    T(C): 36.9 (24 @ 19:45), Max: 36.9 (24 @ 19:45)  HR: 84 (24 @ 19:45) (78 - 84)  BP: 121/75 (24 @ 19:45) (112/71 - 121/75)  RR: 18 (24 @ 19:45) (17 - 18)  SpO2: 100% (24 @ 19:45) (99% - 100%)    Gen: NAD, AOx3, resting comfortably on room air   Abdomen:  Soft, non-tender, non-distended  Uterus:  Fundus firm below umbilicus  VE:  Expected lochia  Ext:  b/l LE non-tender                           9.2    13.44 )-----------( 168      ( 2024 07:17 )             27.0           
S:GHISLAINE ARMSTRONG is a 33y FemaleGestational Age     , status post vaginal delivery with chorio s/p antibiotics tx.      Other pregnancy-related conditions, antepartum: IVF    Weeks of gestation of pregnancy not specified    MEWS Score    39w6d    Asthma    SysAdmin_VstLnk        Patient is a 33y old  Female who presents with a chief complaint of discomfort in the perinium.     PAST MEDICAL & SURGICAL HISTORY:  Asthma               GHISLAINE Caldwell is doing well.  Pain is well managed with present medications. She is ambulating.       Breast feeding is going well.    MEDICATIONS  (STANDING):  acetaminophen     Tablet .. 975 milliGRAM(s) Oral <User Schedule>  diphtheria/tetanus/pertussis (acellular) Vaccine (Adacel) 0.5 milliLiter(s) IntraMuscular once  ibuprofen  Tablet. 600 milliGRAM(s) Oral every 6 hours  oxytocin Infusion 41.667 milliUNIT(s)/Min (125 mL/Hr) IV Continuous <Continuous>  prenatal multivitamin 1 Tablet(s) Oral daily  saccharomyces boulardii 250 milliGRAM(s) Oral two times a day    MEDICATIONS  (PRN):  benzocaine 20%/menthol 0.5% Spray 1 Spray(s) Topical every 6 hours PRN for Perineal discomfort  dibucaine 1% Ointment 1 Application(s) Topical every 6 hours PRN Perineal discomfort  diphenhydrAMINE 25 milliGRAM(s) Oral every 6 hours PRN Pruritus  hydrocortisone 1% Cream 1 Application(s) Topical every 6 hours PRN Moderate Pain (4-6)  lanolin Ointment 1 Application(s) Topical every 6 hours PRN nipple soreness  magnesium hydroxide Suspension 30 milliLiter(s) Oral two times a day PRN Constipation  oxyCODONE    IR 5 milliGRAM(s) Oral every 3 hours PRN Moderate to Severe Pain (4-10)  oxyCODONE    IR 5 milliGRAM(s) Oral once PRN Moderate to Severe Pain (4-10)  pramoxine 1%/zinc 5% Cream 1 Application(s) Topical every 4 hours PRN Moderate Pain (4-6)  simethicone 80 milliGRAM(s) Chew every 4 hours PRN Gas  witch hazel Pads 1 Application(s) Topical every 4 hours PRN Perineal discomfort      I&O's Summary    2024 07:01  -  2024 07:00  --------------------------------------------------------  IN: 0 mL / OUT: 1930 mL / NET: -1930 mL        O:T(C): 37.1 (24 @ 20:00), Max: 37.1 (24 @ 20:00)  HR: 74 (24 @ 20:00) (64 - 77)  BP: 110/67 (24 @ 20:00) (108/60 - 118/62)  RR: 17 (24 @ 20:00) (17 - 18)  SpO2: 99% (24 @ 20:00) (98% - 99%)  Wt(kg): --                                       9.2    13.44 )-----------( 168      ( 2024 07:17 )             27.0                     Blood type:   O+                                             Rubella: Immune                   RPR: NR          General: alert and oriented        Breast:  soft, nontender,         Abdomin:  soft nontender, BS+        Fundus:  firm, nontender, below the umbilicus        Extremities:  no edema, no cyanosis, normal reflexes        Lochia:  mild                  A:  Stable postpartum on Day 1.      P:  Routine postpartum care. I reviewed pain medications with her-ibuprofen 600 mg every 6 hours/tylenol 650mg four hours after         ibuprofen dose to cover breakthrough pain.

## 2024-02-06 PROBLEM — J45.909 UNSPECIFIED ASTHMA, UNCOMPLICATED: Chronic | Status: ACTIVE | Noted: 2024-02-01

## 2024-02-07 ENCOUNTER — APPOINTMENT (OUTPATIENT)
Age: 34
End: 2024-02-07

## 2024-02-08 ENCOUNTER — APPOINTMENT (OUTPATIENT)
Age: 34
End: 2024-02-08
Payer: COMMERCIAL

## 2024-02-08 PROCEDURE — S9443: CPT

## 2024-04-18 NOTE — OB PROVIDER H&P - NS_SPECEXAM_OBGYN_ALL_OB
Patient returning a missed call in regards eligibility assessment. Pt stated she preferred to be called between 9am - 1pm.   Writer called pt to begin eligibility assessment for WPCC. Left VM   Quality 137: Melanoma: Continuity Of Care - Recall System: Patient information entered into a recall system that includes: target date for the next exam specified AND a process to follow up with patients regarding missed or unscheduled appointments Detail Level: Detailed Quality 224: Stage 0-Iic Melanoma: Overutilization Of Imaging Studies For Only Stage 0-Iic Melanoma: None of the following diagnostic imaging studies ordered: chest X-ray, CT, Ultrasound, MRI, PET, or nuclear medicine scans (ML) When Should The Patient Follow-Up For Their Next Full-Body Skin Exam?: 3 Months Detail Level: Simple No

## 2024-05-14 NOTE — OB RN DELIVERY SUMMARY - BABY A: VOID IN DELIVERY
Pt's wife, Basilia, was informed of the message below that Dr. Morelos can see pt on 05/20/2024 at 1700. Instructed his last pt for the day is at 1625 so he will be on his way shortly after that.    Basilia verbalized understanding. Address was confirmed.   no

## 2024-06-13 NOTE — OB PROVIDER DELIVERY SUMMARY - BABY A: DATE/TIME OF DELIVERY
Spoke with Radha at [facility] who states within the last 30 minutes, the patient has had a \"major\" mental status change. Diagnosed with Alzheimer's; however, states patient has \"never acted like this before.\" States the patient is fighting and aggressive with staff, biting and spitting, and then \"goes limp\" for a short time. Does not lose consciousness, vitals are stable. No recent environmental changes or changes at the facility.     Abdomen is \"very\" distended. Last BM was this AM and was loose (baseline). No c/o abdominal pain. Staff deny any urinary concerns. Unsure if the patient is passing gas. Denies any recent falls. Eating and drinking poorly.     Radha states she is \"very concerned\" and patient will be evaluated in the ED.    02-Feb-2024 07:25

## 2024-07-24 ENCOUNTER — APPOINTMENT (OUTPATIENT)
Dept: ORTHOPEDIC SURGERY | Facility: CLINIC | Age: 34
End: 2024-07-24
Payer: COMMERCIAL

## 2024-07-24 VITALS — WEIGHT: 140 LBS | HEIGHT: 64 IN | BODY MASS INDEX: 23.9 KG/M2

## 2024-07-24 DIAGNOSIS — M47.812 SPONDYLOSIS W/OUT MYELOPATHY OR RADICULOPATHY, CERVICAL REGION: ICD-10-CM

## 2024-07-24 DIAGNOSIS — Z78.9 OTHER SPECIFIED HEALTH STATUS: ICD-10-CM

## 2024-07-24 PROCEDURE — 72070 X-RAY EXAM THORAC SPINE 2VWS: CPT

## 2024-07-24 PROCEDURE — 72040 X-RAY EXAM NECK SPINE 2-3 VW: CPT

## 2024-07-24 PROCEDURE — 99204 OFFICE O/P NEW MOD 45 MIN: CPT

## 2024-07-24 RX ORDER — LIDOCAINE 5% 700 MG/1
5 PATCH TOPICAL
Qty: 30 | Refills: 1 | Status: ACTIVE | COMMUNITY
Start: 2024-07-24 | End: 1900-01-01

## 2024-07-27 NOTE — HISTORY OF PRESENT ILLNESS
[de-identified] :  2024 - Patient presents to the office for initial evaluation of neck pain. She reports a long history of intermittent neck, pain without numbness, tingling, weakness or arm symptoms. Neck pain was typically managed with anti-inflammatory medication's and activity restriction. She has seen chiropractor in the past. Current episode of neck pain has been present for two weeks, no recent trauma. She is breast-feeding and has a six month old  at home. Difficulty sleeping at night.  Some improvement with anti-inflammatory and historically has done well with lidocaine patches.  The patient is a 34 year female who presents today complaining of neck, and upper back Date of Injury/Onset: chronic, flared up last few months Pain:    At Rest: 8/10  With Activity:  3/10  Mechanism of injury: NKI, after breastfeeding for the last 6 months Quality of symptoms: achy, dull, sharp Improves with: nothing Worse with: at rest, sleeping, in the morning Prior treatment: no Prior Imaging: no Additional Information: None

## 2024-07-27 NOTE — DISCUSSION/SUMMARY
[de-identified] : Recommend conservative management home exercise protocol provided. Patient referred to out patient physical therapy. She will discuss with a patient consultant and OB/GYN continued use of non-steroid anti-inflammatories and possible ERIKA. She has done well with lidocaine patches in the past. Patient will confirm with OBGYN use of the lidocaine patches. Recommend follow up after physical therapy 4 to 6 weeks and will consider advanced imaging if she does not improve as expected. Activity restrictions discussed.  Prior to appointment and during encounter with patient extensive medical records were reviewed including but not limited to, hospital records, outpatient records, imaging results, and lab data. During this appointment the patient was examined, diagnoses were discussed and explained in a face to face manner. In addition extensive time was spent reviewing aforementioned diagnostic studies. Counseling including abnormal image results, differential diagnoses, treatment options, risk and benefits, lifestyle changes, current condition, and current medications was performed. Patient's comments, questions, and concerns were addressed and patient verbalized understanding. Based on this patient's presentation at our office, which is an orthopedic spine surgeon's office, this patient inherently / intrinsically has a risk, however minute, of developing issues such as Cauda equina syndrome, bowel and bladder changes, or progression of motor or neurological deficits such as paralysis which may be permanent.

## 2024-07-27 NOTE — PHYSICAL EXAM
[Normal Coordination] : normal coordination [Normal DTR UE/LE] : normal DTR UE/LE  [Normal Sensation] : normal sensation [Normal Mood and Affect] : normal mood and affect [Oriented] : oriented [Able to Communicate] : able to communicate [Normal Skin] : normal skin [No Rash] : no rash [No Ulcers] : no ulcers [No Lesions] : no lesions [No obvious lymphadenopathy in areas examined] : no obvious lymphadenopathy in areas examined [Well Developed] : well developed [Peripheral vascular exam is grossly normal] : peripheral vascular exam is grossly normal [No Respiratory Distress] : no respiratory distress [Lungs clear to auscultation bilaterally] : lungs clear to auscultation bilaterally [Normal Bowel Sounds] : normal bowel sounds [Non-Tender] : non-tender [No HSM] : no HSM [No Mass] : no mass [NL (45)] : right lateral flexion 45 degrees [NL (80)] : right lateral rotation 80 degrees [5___] : right grasp 5[unfilled]/5 [Biceps 2+] : biceps 2+ [Triceps 2+] : triceps 2+ [Brachioradialis 2+] : brachioradialis 2+ [] : full ROM with pain [FreeTextEntry8] : tenderness in the posterior cervical and thoracic musculature

## 2024-07-27 NOTE — DATA REVIEWED
[I independently reviewed and interpreted images and report] : I independently reviewed and interpreted images and report [FreeTextEntry2] : in office x-rays thoracic spine ap/lat 07/24/2024 demonstrates mild scoliosis [FreeTextEntry1] : I stop paperwork reviewed

## 2024-07-27 NOTE — HISTORY OF PRESENT ILLNESS
[de-identified] :  2024 - Patient presents to the office for initial evaluation of neck pain. She reports a long history of intermittent neck, pain without numbness, tingling, weakness or arm symptoms. Neck pain was typically managed with anti-inflammatory medication's and activity restriction. She has seen chiropractor in the past. Current episode of neck pain has been present for two weeks, no recent trauma. She is breast-feeding and has a six month old  at home. Difficulty sleeping at night.  Some improvement with anti-inflammatory and historically has done well with lidocaine patches.  The patient is a 34 year female who presents today complaining of neck, and upper back Date of Injury/Onset: chronic, flared up last few months Pain:    At Rest: 8/10  With Activity:  3/10  Mechanism of injury: NKI, after breastfeeding for the last 6 months Quality of symptoms: achy, dull, sharp Improves with: nothing Worse with: at rest, sleeping, in the morning Prior treatment: no Prior Imaging: no Additional Information: None

## 2024-07-27 NOTE — DISCUSSION/SUMMARY
[de-identified] : Recommend conservative management home exercise protocol provided. Patient referred to out patient physical therapy. She will discuss with a patient consultant and OB/GYN continued use of non-steroid anti-inflammatories and possible ERIKA. She has done well with lidocaine patches in the past. Patient will confirm with OBGYN use of the lidocaine patches. Recommend follow up after physical therapy 4 to 6 weeks and will consider advanced imaging if she does not improve as expected. Activity restrictions discussed.  Prior to appointment and during encounter with patient extensive medical records were reviewed including but not limited to, hospital records, outpatient records, imaging results, and lab data. During this appointment the patient was examined, diagnoses were discussed and explained in a face to face manner. In addition extensive time was spent reviewing aforementioned diagnostic studies. Counseling including abnormal image results, differential diagnoses, treatment options, risk and benefits, lifestyle changes, current condition, and current medications was performed. Patient's comments, questions, and concerns were addressed and patient verbalized understanding. Based on this patient's presentation at our office, which is an orthopedic spine surgeon's office, this patient inherently / intrinsically has a risk, however minute, of developing issues such as Cauda equina syndrome, bowel and bladder changes, or progression of motor or neurological deficits such as paralysis which may be permanent.

## 2024-07-27 NOTE — DATA REVIEWED
[I independently reviewed and interpreted images and report] : I independently reviewed and interpreted images and report None [FreeTextEntry2] : in office x-rays thoracic spine ap/lat 07/24/2024 demonstrates mild scoliosis [FreeTextEntry1] : I stop paperwork reviewed None

## 2025-01-27 ENCOUNTER — APPOINTMENT (OUTPATIENT)
Dept: FAMILY MEDICINE | Facility: CLINIC | Age: 35
End: 2025-01-27
Payer: COMMERCIAL

## 2025-01-27 VITALS
SYSTOLIC BLOOD PRESSURE: 104 MMHG | OXYGEN SATURATION: 99 % | HEART RATE: 70 BPM | BODY MASS INDEX: 23.05 KG/M2 | DIASTOLIC BLOOD PRESSURE: 70 MMHG | WEIGHT: 135 LBS | HEIGHT: 64 IN

## 2025-01-27 PROCEDURE — 99213 OFFICE O/P EST LOW 20 MIN: CPT

## 2025-01-27 PROCEDURE — G2211 COMPLEX E/M VISIT ADD ON: CPT | Mod: NC

## 2025-01-27 RX ORDER — VALACYCLOVIR 1 G/1
1 TABLET, FILM COATED ORAL
Qty: 21 | Refills: 0 | Status: ACTIVE | COMMUNITY
Start: 2025-01-27 | End: 1900-01-01

## 2025-01-28 RX ORDER — GABAPENTIN 100 MG/1
100 CAPSULE ORAL
Qty: 30 | Refills: 1 | Status: ACTIVE | COMMUNITY
Start: 2025-01-28 | End: 1900-01-01

## 2025-02-03 DIAGNOSIS — B02.9 ZOSTER W/OUT COMPLICATIONS: ICD-10-CM

## 2025-02-03 RX ORDER — VALACYCLOVIR 1 G/1
1 TABLET, FILM COATED ORAL 3 TIMES DAILY
Qty: 9 | Refills: 0 | Status: ACTIVE | COMMUNITY
Start: 2025-02-03 | End: 1900-01-01

## 2025-03-10 ENCOUNTER — NON-APPOINTMENT (OUTPATIENT)
Age: 35
End: 2025-03-10

## 2025-03-13 ENCOUNTER — APPOINTMENT (OUTPATIENT)
Dept: FAMILY MEDICINE | Facility: CLINIC | Age: 35
End: 2025-03-13
Payer: COMMERCIAL

## 2025-03-13 VITALS
HEART RATE: 62 BPM | DIASTOLIC BLOOD PRESSURE: 64 MMHG | SYSTOLIC BLOOD PRESSURE: 114 MMHG | BODY MASS INDEX: 23.56 KG/M2 | OXYGEN SATURATION: 96 % | WEIGHT: 138 LBS | HEIGHT: 64 IN

## 2025-03-13 DIAGNOSIS — Z00.00 ENCOUNTER FOR GENERAL ADULT MEDICAL EXAMINATION W/OUT ABNORMAL FINDINGS: ICD-10-CM

## 2025-03-13 PROCEDURE — 36415 COLL VENOUS BLD VENIPUNCTURE: CPT

## 2025-03-13 PROCEDURE — 99395 PREV VISIT EST AGE 18-39: CPT

## 2025-03-14 LAB
ALBUMIN SERPL ELPH-MCNC: 4.4 G/DL
ALP BLD-CCNC: 87 U/L
ALT SERPL-CCNC: 29 U/L
ANION GAP SERPL CALC-SCNC: 14 MMOL/L
AST SERPL-CCNC: 21 U/L
BASOPHILS # BLD AUTO: 0.06 K/UL
BASOPHILS NFR BLD AUTO: 0.8 %
BILIRUB SERPL-MCNC: 0.4 MG/DL
BUN SERPL-MCNC: 14 MG/DL
CALCIUM SERPL-MCNC: 9.2 MG/DL
CHLORIDE SERPL-SCNC: 104 MMOL/L
CHOLEST SERPL-MCNC: 141 MG/DL
CO2 SERPL-SCNC: 24 MMOL/L
CREAT SERPL-MCNC: 0.83 MG/DL
EGFRCR SERPLBLD CKD-EPI 2021: 95 ML/MIN/1.73M2
EOSINOPHIL # BLD AUTO: 0.07 K/UL
EOSINOPHIL NFR BLD AUTO: 0.9 %
ESTIMATED AVERAGE GLUCOSE: 100 MG/DL
GLUCOSE SERPL-MCNC: 96 MG/DL
HBA1C MFR BLD HPLC: 5.1 %
HCG SERPL-MCNC: <1 MIU/ML
HCT VFR BLD CALC: 40.4 %
HDLC SERPL-MCNC: 58 MG/DL
HGB BLD-MCNC: 14.2 G/DL
IMM GRANULOCYTES NFR BLD AUTO: 0.3 %
LDLC SERPL CALC-MCNC: 71 MG/DL
LYMPHOCYTES # BLD AUTO: 3.55 K/UL
LYMPHOCYTES NFR BLD AUTO: 44.5 %
MAN DIFF?: NORMAL
MCHC RBC-ENTMCNC: 29.5 PG
MCHC RBC-ENTMCNC: 35.1 G/DL
MCV RBC AUTO: 83.8 FL
MONOCYTES # BLD AUTO: 0.56 K/UL
MONOCYTES NFR BLD AUTO: 7 %
NEUTROPHILS # BLD AUTO: 3.72 K/UL
NEUTROPHILS NFR BLD AUTO: 46.5 %
NONHDLC SERPL-MCNC: 82 MG/DL
PLATELET # BLD AUTO: 258 K/UL
POTASSIUM SERPL-SCNC: 4.9 MMOL/L
PROT SERPL-MCNC: 7.2 G/DL
RBC # BLD: 4.82 M/UL
RBC # FLD: 13.9 %
SODIUM SERPL-SCNC: 142 MMOL/L
TRIGL SERPL-MCNC: 53 MG/DL
TSH SERPL-ACNC: 1.46 UIU/ML
WBC # FLD AUTO: 7.98 K/UL

## 2025-04-01 NOTE — OB RN TRIAGE NOTE - NS_[email or web address]_OBGYN_ALL_OB
Patient ID: Sejal Duff is a 25 y.o. female.  Referring Physician: Pepper Davidson, APRN-CNP  [id number] [place] [address]  Primary Care Provider: Melba Graham MD    Virtual or Telephone Consent    An interactive audio and video telecommunication system which permits real time communications between the patient (HOME) and provider at [facility] was utilized to provide this telehealth service.   Verbal consent was requested and obtained from patient on this date, 02/28/25 for a telehealth visit.    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   -12/01/24 developed hydrocephalus and is s/p  shunt  -12/21/24 D1 tovarefenib (type II JUAN CARLOS inhibitor)   -12/29/24 revision of  shunt    Current treatment plan: curative intent tovarefenib with goal of mass reduction with hope for surgical removal    Subjective    Sejal Duff is a 25 y.o. female with a past history of anemia, asthma GERD, IBS, PCOS and breast fibroadenoma s/p L breast excisional biopsy, now with recent diagnosis of astrocytoma.     Returns today in follow up regarding issues unique to young adults with neoplasm.    Met with both neuro-onc and palliative care today. Current issues include ongoing fatigue, skin rashes, epistaxis, abdominal pain/chronic constipation.     In the interim since our visit one month ago she presented to the ED for painful skin rash and was found to have anemia, hgb to 7.8 she was admitted for observation. Is following with dermatology re skin rashes/management.    Not currently working. Admits to low  energy and at times debilitating fatigue unable to get out of bed for days. Denies depressed mood or anxiety - but admits that her main form of coping is avoidance. Is not currently engaging with peers/friends - does not get out of the house much. Enjoys spending time with family.     Not dating or in a relationship. Not currently sexually active. LMP 10/2024. She has not experienced a period in the time she has been on tovorafenib.     Practically, still dealing with financial and food insecurity. Did apply to SSD, uncertain if she applied for SSI. Has referral in place for food for [email or web address] but has not called to schedule.     Social History: Grew up in [place]. Has 20 1/2 siblings from her father, and 1 1/2 sibling from her mother. Currently lives alone in [place]. 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 She enjoys shopping. Never smoker, does not vape. Does use ETOH and marijuana. Email [email or web address]    Family History:  Colon cancer MGM, mGF  Breast cancer MGGF  Cancer unk primary MGGM, PAXTON's, MA's    Review of Systems   Constitutional:  Positive for appetite change and fatigue.   Gastrointestinal:  Positive for abdominal distention, constipation and nausea.   Genitourinary:  Positive for menstrual problem. Negative for vaginal bleeding.    Neurological:  Positive for headaches.   Psychiatric/Behavioral:  Positive for depression. Negative for decreased concentration and sleep disturbance. The patient is not nervous/anxious.      Objective   BSA: There is no height or weight on file to calculate BSA.  There were no vitals taken for this visit. No VS or weight recorded due to the telehealth nature of this visit.     Family History   Problem Relation Name Age of Onset    Asthma Mother      Colon cancer Mother       Oncology History   Pilocytic astrocytoma (Multi)   10/2024 Initial Diagnosis    26 yo F with PMH of anemia, asthma, GERD, IBS,  PCOS, and breast fibroadenoma s/p L breast excisional biopsy presented to [facility] ED on 10/14/24 with headache. CTH parasellar mass with calcifications. MRI with 2.4 cm peripherally enhancing necrotic mass with non-enhancing FLAIR hyperintense lesion in medial L temporal lobe. Admitted for neurosurgical management.     10/15/2024 Biopsy    R stereotactic brain biopsy  Surgeon: Den Segal MD    Prelim path: Glial cells with luigi fibers vs craniopharyngioma     10/23/2024 Tumor Board    Procedure: R stereotactic brain biopsy 10/15/2024   Pathology: Final pending; likely to be a glioma, areas of necrosis noted.       The CNS Tumor Board tumor board considered available treatment options and made the following recommendations: Radiation oncology referral. Add back to CNS TB once pathology is completed.     11/5/2024 Pathology     A.  B.  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  See note     Note:  Microscopic examination for parts A and B reveal a low-grade glial neoplasm immunoreactive with OLIG-2 and GFAP.  Neurofilament immunohistochemical stain highlights neuropil and scattered neuronal bodies.  ATRX and I6H70ot immunohistochemical stains are retained.  IDH1 and BRAF immunohistochemical stains are negative.  GLIOSEQ studies reveal a BRAF-V600E mutation.  This finding in conjunction with the morphology are most consistent with a pilocytic astrocytoma.  However, a ganglioglioma among other low-grade glial/glial neuronal neoplasms, cannot be completely ruled out.  Clinical correlation is recommended     11/6/2024 Tumor Board    CSF analysis negative.   Pathology:  Low grade glial neoplasm - BRAF - V600E mutation      The CNS Tumor Board tumor board considered available treatment options and made the following recommendations: Repeat MRI brain w/wo and perfusion study. Sellar imaging. Continue following with neuro-oncology. Radiation oncology referral.       11/29/2024 - 12/6/2024 Hospital Admission    Discharge diagnosis: Hydrocephalus    Returned to the [facility] ED 11/28/24 with worsening HA, light sensitivity, CTH incr vents, MRI incr ventricular mass 2.7x2.5cm, ventriculomegaly, trace R ventricular GRE. Patient admitted to neurosurgery service for further management.    11/28 Ophthalmology evaluation without papilledema.  12/1 s/p R VPS (certas at 5) -- Dr. Yap at [facility]  12/2 CTH with catheter in position.      12/29/2024 - 12/31/2024 Hospital Admission    Discharge diagnosis: Hydrocephalus    Returned to the [facility] ED on 12/28/24 with generalized fatigue, HA and body aches. CT head with increased ventricles, SS Certas at 4, LLQ cath with turn vs possible kink, CT AP catheter without kink or discontinuity, no pseudocyst. Patient admitted to the neurosurgery service for further management/workup.    12/28 s/p shunt tap with no proximal flow.   12/29 s/p proximal shunt revision, distal cath flushing. CT head post op with decreased ventricles, catheter in position.         Sejal Duff  reports that she has never smoked. She has never been exposed to tobacco smoke. She has never used smokeless tobacco.  She  reports current alcohol use.  She  reports current drug use. Drug: Marijuana.    Physical Exam  Constitutional:       General: She is not in acute distress.     Appearance: Normal appearance. She is not ill-appearing.   Neurological:      Mental Status: She is alert.   Psychiatric:         Mood and Affect: Mood normal.         Behavior: Behavior normal.         Thought Content: Thought content normal.         Judgment: Judgment normal.     Performance Status:  Asymptomatic    Assessment/Plan    Sejal Duff is a 25 y.o. F with a recent diagnosis of astrocytoma with future plans for oral systemic therapy and possible radiation therapy.     #Psychosocial: young adult with benign neoplasm, being treated and followed by neuro-oncology  - Previously discussed  resources available including psychiatry, psychology, social work, spiritual care, and expressive therapies  - Connected to young adult oncology program, will receive monthly social programming invitations  - Consider providing community resources for peer support/connection in future  - Following with Paige MOSQUEDA - reconnected today for discussion about SSI, patient has already applied for SSD  - Has SNAP benefits - identified food insecurity, referral to [facility] is in place, patient needs to call   - Sent cancer to 5k exercise training program     #Sexual dysfunction/contraception:   - Previously discussed the seriousness of getting pregnant while receiving oral chemotherapy due to the harmful effects this could have on the  fetus, and on her cancer treatment given the decreased availability of medical  services.   - She is not interested in use of hormonal or non hormonal contraceptives - including IUDs out of concern for their role in developing brain cancer  - Previously discussed that she must prevent pregnancy with use of barrier method every time she engages in penetrative intercourse  - Previously discussed that small amounts of chemotherapy may be found in her body secretions including vaginal secretions and she should use a barrier form of contraception such as a male or female condom to protect her partner from chemotherapy exposure.    #Risk for infertility:  - Has previously been seen by colleagues in [place]  - Discussed natural age related decline in fertility and menopause that occurs as a result of ovarian aging, and that cancer treatments can accellerate that progression and diminish ovarian reserve.  - Individuals may experience varying degrees of short or long term ovarian dysfunction and amenorrhea, and that this is dependent upon type of cancer treatment, cumulative dose, and patients age.   - Loss of ovarian function may be permanent or temporary.  - Risk to fertility  is UNKNOWN based on cancer treatment of tovarefenib - there is little research about the effects of these agents on ovarian reserve  - Declined fertility preservation prior to initiation of chemotherapy  - Reviewed baseline hormone function testing AMH 5.2 on 11/14/24, E2, FSH/LH WNL  - Experiencing amenorrhea since 10/2024  - We discussed trending her ovarian function q 6 months while she is on therapy, last drawn 10/2024, will repeat in April 2025  - Discussed a plan to monitor hormone function for s/s of hypogonadism    #Oligomenorrhea: unclear etiology r/t underlying CNS/glioma, vs oral tovarefenib, vs PCOS (with features of oligomenorrhea, acne, +10 <10mm follicles on bilateral ovaries per US, and elevated AMH)  - Will discuss pathophysiology of PCOS with patient at next visit  - May need to consider progesterone induced withdrawal bleeds  - Consider referral to Dr. Griffiths for comprehensive PCOS management     #Chronic constipation/IBS: chronic, lifelong constipation with minimal improvement despite multiple interventions  - Follows with gastroenterology  - Has previously had NG clean outs at [facility], hydrocolonic therapy, rectal biofeedback, senna, bisacodyl, miralax, golytely, enemas, suppositories, probiotics, and acupuncture  - Currently on linzess and motegrity without significant improvement    #Diet/Exercise/Healthy Lifestyle:  - Previously discussed research demonstrating consumption of a healthy, plant based diet not only decreases cancer risk, but also has been found to improve survival in cancer patients who partake in a healthy diet.   - Previously discussed research that demonstrates decreased cancer risk and improved survival in cancer patients who exercise and stay active throughout diagnosis and treatment.  - Encouraged patient to continue gentle exercise as tolerated  - Encouraged patient to continue to abstain from alcohol, tobacco, and recreational drugs    #Genetic Risk: young adult with cancer  and personal family history of cancer  - Discussed rationale for genetic risk consultation consider placing referral in future    The amount of time that I spent with the patient:  Prep: 5  Time spend directly with patient: 20  Coordinating care: 5  Documentation: 5  Other time:    Total time spent on encounter: 40    Follow up planned in one month                Pepper Davidson, APRN-CNP                          Labor at Term

## 2025-07-29 NOTE — OB RN PATIENT PROFILE - NSSDOHHEADEN_OBGYN_A_OB
no Detail Level: Detailed Photo Preface (Leave Blank If You Do Not Want): Photographs were obtained today